# Patient Record
Sex: MALE | URBAN - METROPOLITAN AREA
[De-identification: names, ages, dates, MRNs, and addresses within clinical notes are randomized per-mention and may not be internally consistent; named-entity substitution may affect disease eponyms.]

---

## 2024-02-13 ENCOUNTER — OFFICE VISIT (OUTPATIENT)
Dept: GASTROENTEROLOGY | Facility: CLINIC | Age: 29
End: 2024-02-13
Payer: MEDICAID

## 2024-02-13 VITALS
BODY MASS INDEX: 34.81 KG/M2 | OXYGEN SATURATION: 98 % | SYSTOLIC BLOOD PRESSURE: 140 MMHG | DIASTOLIC BLOOD PRESSURE: 87 MMHG | HEIGHT: 72 IN | HEART RATE: 70 BPM | WEIGHT: 257 LBS

## 2024-02-13 DIAGNOSIS — K62.5 RECTAL BLEEDING: ICD-10-CM

## 2024-02-13 DIAGNOSIS — K21.9 GASTROESOPHAGEAL REFLUX DISEASE, UNSPECIFIED WHETHER ESOPHAGITIS PRESENT: Primary | ICD-10-CM

## 2024-02-13 DIAGNOSIS — K52.9 CHRONIC DIARRHEA: ICD-10-CM

## 2024-02-13 DIAGNOSIS — R10.13 DYSPEPSIA: ICD-10-CM

## 2024-02-13 PROCEDURE — 99204 OFFICE O/P NEW MOD 45 MIN: CPT | Performed by: PHYSICIAN ASSISTANT

## 2024-02-13 RX ORDER — POLYETHYLENE GLYCOL 3350, SODIUM SULFATE ANHYDROUS, SODIUM BICARBONATE, SODIUM CHLORIDE, POTASSIUM CHLORIDE 236; 22.74; 6.74; 5.86; 2.97 G/4L; G/4L; G/4L; G/4L; G/4L
4000 POWDER, FOR SOLUTION ORAL ONCE
Qty: 4000 ML | Refills: 0 | Status: SHIPPED | OUTPATIENT
Start: 2024-02-13 | End: 2024-02-13

## 2024-02-13 RX ORDER — OMEPRAZOLE 40 MG/1
40 CAPSULE, DELAYED RELEASE ORAL DAILY
Qty: 30 CAPSULE | Refills: 3 | Status: SHIPPED | OUTPATIENT
Start: 2024-02-13 | End: 2024-06-12

## 2024-02-13 NOTE — PROGRESS NOTES
Subjective   Patient ID: Xavier Patel is a 28 y.o. male who was referred by himself for New Patient Visit (Stomach pain, occasional blood in stool, nausea daily - no prior scopes).    Patient's PCP is Dr. Hill    PMH: noncontributory    HPI  Patient states that he has had GI symptoms since he was 19. He states that he has a lot of postprandial bloating and acid reflux. He has been trying peptobismal and OTC omeprazole PRN. However, symptoms have worsened over the past several months. He reports acid reflux with tomato products/acidic foods. He does report daily nausea, but no vomiting. He states that he also has chronic loose stools. He reports 3-4 episodes of loose stool daily. He reports occasional nocturnal diarrhea. He has not been trying anything for his symptoms. He reports generalized abdominal pain. He has had a new development of bright red blood in his stool. No reported rectal pain or irritation. He has never had endoscopy before. Denies unexplained weight loss, dysphagia, vomiting. He has had some dark stool, but has been using peptobismal. No obvious melena. He denies any first degree relatives with IBD or GI malignancy. Denies NSAID use. Denies tobacco or regular alcohol use, reports occasional marijuana use.     Prior GI evaluation:  None    Review of Systems:  Constitutional: No reported fever, chills, or weight loss.  Skin: No reported icterus, lesions, or rash.  Eye: No reported itching, pain, vision changes.  Ear: No reported discharge, hearing loss, or pain.  Nose: No reported congestion, discharge, or epistaxis.  Mouth/throat: No reported dysphagia, hoarseness, or throat pain.  Resp: No reported cough, dyspnea, or wheezing.  Cardiovascular: No reported chest pain, lower extremity edema, or palpitations.   GI: Reports chronic heartburn, abdominal pain, diarrhea, rectal bleeding.  : No reported dysuria, hematuria, or frequency.  Neuro: No reported confusion, memory loss, headaches, or  "dizziness.  Psych: No reported anxiety, depression, or insomnia.  Musculoskeletal: No reported arthralgia, joint swelling, or myalgias.  Heme/lymph: No reported easy bleeding or bruising, or swollen lymph nodes.  Endocrine: No reported cold/heat intolerance, polydipsia, or polyuria.     Medications:  Prior to Admission medications    Not on File       Allergies:  Patient has no known allergies.    Past Medical History:  He has no past medical history on file.    Past Surgical History:  He has a past surgical history that includes Other surgical history (09/24/2021).    Social History:  He reports that he has quit smoking. His smoking use included cigarettes. He does not have any smokeless tobacco history on file. He reports current alcohol use. He reports current drug use. Drug: Marijuana.    Objective   Physical exam:  Constitutional: Well developed, well nourished 28 y.o. year old in no acute distress.   Skin: Warm and dry. Normal turgor. No rash, ulcer, trauma, jaundice.   Eyes: Pupils symmetric and reactive to light.  Respiratory: Clear to auscultation bilaterally. No wheezes, rhonchi, or rales heard.  Cardiovascular: Regular rate and rhythm. S1 and S2 appreciated and normal. No murmur, rub, or gallop heard.   Edema: No edema noted.  GI: Normal bowel sounds. Soft, non-distended, diffuse TTP, worse around umbilicus. No rebound or guarding present. No hepatomegaly or splenomegaly appreciated. Abdominal aorta not palpably abnormal.  Musculoskeletal: Limbs and Joints grossly normal. Full range of motion in major joint.   Neuro: Alert and oriented x 3. Cranial nerves 2-12 grossly intact.   Psych: Normal mood and affect.        Relevant Results Recent labs reviewed in the EMR.  No results found for: \"HGB\", \"MCV\", \"PLT\"    No results found for: \"FERRITIN\", \"IRON\"    No results found for: \"NA\", \"K\", \"CL\", \"BUN\", \"CREATININE\"    No results found for: \"BILITOT\"  No results found for: \"ALT\", \"AST\", \"ALKPHOS\"    No results " "found for: \"CRP\"    No results found for: \"CALPS\"    Radiology: Reviewed imaging reviewed in the EMR.  No results found.    Assessment/Plan   Problem List Items Addressed This Visit             ICD-10-CM    Acid reflux - Primary K21.9     Patient with years of acid reflux, only using OTC omeprazole or pepto bismal PRN. No red flag symptoms, has had some dark stools with peptobismal. Will add omeprazole 40mg daily and order CBC.         Relevant Medications    omeprazole (PriLOSEC) 40 mg DR capsule    Other Relevant Orders    H. Pylori Breath Test    CBC    Dyspepsia R10.13     PPI started. Major symptom is bloating. TTgIgA ordered as well. May be GERD.         Relevant Medications    omeprazole (PriLOSEC) 40 mg DR capsule    Other Relevant Orders    H. Pylori Breath Test    Rectal bleeding K62.5     Colonoscopy and CBC ordered.         Relevant Medications    Golytely 236-22.74-6.74 -5.86 gram solution    Other Relevant Orders    Colonoscopy Diagnostic    Chronic diarrhea K52.9     Patient with 3-4 loose BM daily. However, he has some nocturnal diarrhea and rectal bleeding. Recommended chronic diarrhea labs and stool studies for further evaluation. Colonoscopy ordered to rule out IBD or other causes.          Relevant Medications    Golytely 236-22.74-6.74 -5.86 gram solution    Other Relevant Orders    Colonoscopy Diagnostic    Calprotectin, Fecal    C-Reactive Protein    Pancreatic Elastase, Fecal    Thyroid Stimulating Hormone    Tissue Transglutaminase IgA         Brenda Saravia PA-C    "

## 2024-02-13 NOTE — ASSESSMENT & PLAN NOTE
Patient with 3-4 loose BM daily. However, he has some nocturnal diarrhea and rectal bleeding. Recommended chronic diarrhea labs and stool studies for further evaluation. Colonoscopy ordered to rule out IBD or other causes.

## 2024-02-13 NOTE — PATIENT INSTRUCTIONS
Thank you for coming in for your appointment.    -Get labs and stool studies done  -Get colonoscopy done  -Do H. Pylori breath test then start Omeprazole 40mg daily    Follow up after testing

## 2024-02-13 NOTE — ASSESSMENT & PLAN NOTE
Patient with years of acid reflux, only using OTC omeprazole or pepto bismal PRN. No red flag symptoms, has had some dark stools with peptobismal. Will add omeprazole 40mg daily and order CBC.

## 2024-02-20 ENCOUNTER — TELEPHONE (OUTPATIENT)
Dept: GASTROENTEROLOGY | Facility: CLINIC | Age: 29
End: 2024-02-20
Payer: MEDICAID

## 2024-02-20 NOTE — TELEPHONE ENCOUNTER
LVM to call our office back.       Pt needs to be aware that we are not in network with his insurance. [employer] medicaid. He has had an office visit with us and has a procedure on 2/27/2024.       Thank You!

## 2024-02-26 ENCOUNTER — APPOINTMENT (OUTPATIENT)
Dept: GASTROENTEROLOGY | Facility: HOSPITAL | Age: 29
End: 2024-02-26
Payer: MEDICAID

## 2024-02-27 ENCOUNTER — APPOINTMENT (OUTPATIENT)
Dept: GASTROENTEROLOGY | Facility: HOSPITAL | Age: 29
End: 2024-02-27
Payer: MEDICAID